# Patient Record
Sex: MALE | Race: WHITE | NOT HISPANIC OR LATINO | URBAN - NONMETROPOLITAN AREA
[De-identification: names, ages, dates, MRNs, and addresses within clinical notes are randomized per-mention and may not be internally consistent; named-entity substitution may affect disease eponyms.]

---

## 2021-04-14 NOTE — PATIENT DISCUSSION
The patient was informed that with the Basic + option and a near vision goal, there is no guarantee of achieving near vision without glasses after surgery. They will most likely need prescription glasses at all focal points. The patient elects Basic + OS, goal of -2.00.

## 2021-05-11 NOTE — PATIENT DISCUSSION
Cataract surgery has been performed in the first eye and activities of daily living are still impaired. The patient would like to proceed with cataract surgery in the second eye as scheduled. The patient elects Basic PLUS IOL OD, goal of -2.00.

## 2021-09-28 ENCOUNTER — IMPORTED ENCOUNTER (OUTPATIENT)
Dept: URBAN - NONMETROPOLITAN AREA CLINIC 1 | Facility: CLINIC | Age: 66
End: 2021-09-28

## 2021-09-28 PROCEDURE — 92310 CONTACT LENS FITTING OU: CPT

## 2021-09-28 PROCEDURE — 99204 OFFICE O/P NEW MOD 45 MIN: CPT

## 2021-09-28 NOTE — PATIENT DISCUSSION
Cataracts OU-  discussed findings w/patient-  no treatment indicated at this time-  UV protection recommended-  continue to monitor yearly or prnPVD OU-  discussed findings w/patient-  Retina flat 360 with no breaks tears or heme. -  S&S of RD/RT reviewed with pt. -  Stressed that pt should contact our office right away with any changes or increase in symptoms.-  RTC 1 year f/u or prnCompound Myopic Astigmatism OU w/Presbyopia-  discussed findings w/patient-  new spectacle/CL Rx issued-  continue to monitor yearly or prn; 's Notes: MR 9/28/2021DFE 9/28/2021

## 2021-09-29 PROBLEM — H25.13: Noted: 2021-09-29

## 2021-09-29 PROBLEM — H52.4: Noted: 2021-09-29

## 2021-09-29 PROBLEM — H52.223: Noted: 2021-09-29

## 2021-09-29 PROBLEM — H52.13: Noted: 2021-09-29

## 2022-04-10 ASSESSMENT — TONOMETRY
OD_IOP_MMHG: 16
OS_IOP_MMHG: 17

## 2022-04-10 ASSESSMENT — VISUAL ACUITY
OU_CC: 20/20
OD_GLARE: 20/40
OD_SC: 20/20
OS_SC: 20/20

## 2022-06-14 NOTE — PATIENT DISCUSSION
The IOP is above the target range in the left eye. Patient is to see Dr. Teddy Lebron for a consult. +/- SLT OS.

## 2022-08-25 NOTE — PATIENT DISCUSSION
The IOP is above the target range in the left eye. Recom. SLT OS secondary  to IOP fluctuation  and eye discomfort (pressure feelings).

## 2022-09-01 NOTE — PROCEDURE NOTE: CLINICAL
PROCEDURE NOTE: SLT #1 OS. Diagnosis: POAG, Mild. Anesthesia: Topical. Prep: Alphagan 0.15%. Prior to treatment, risks/benefits/alternatives discussed including infection, loss of vision, hemorrhage, cataract, glaucoma, retinal tears or detachment. Lens:  SLT laser lens with goniosol. Power: 1.2mJ. Total applications: 787. Application 743 degrees. Patient tolerated procedure well. There were no complications. Post-op instructions given. Post-op IOP = 15 mmHg. Luz Maria Ortiz

## 2022-12-06 NOTE — PATIENT DISCUSSION
New retinal tear seen today. Recommended treatment with barrier laser, Pt consented and tolerated procedure well.

## 2022-12-06 NOTE — PROCEDURE NOTE: CLINICAL
PROCEDURE NOTE: Laser for Retinal Tear OD. Diagnosis: Horseshoe Tear of Retina Without Detachment. Anesthesia: Topical. Prior to laser, risks/benefits/alternatives to laser discussed including loss of vision, decreased peripheral and night vision, need for more laser and/or surgery and patient wished to proceed. A written consent is on file, and the need for today’s laser was discussed and the patient is understanding and wishes to proceed. Laser Lens: super quad/28 D. Wavelength: Argon Green. Spot size: 100 um. Pulse power: 360 -500  mW. Number of pulses: 40. Patient tolerated procedure well. There were no complications. Post-op instructions given. Patient given office phone number/answering service number and advised to call immediately should there be loss of vision or pain, or should they have any other questions or concerns. Tomasz Sanches

## 2022-12-06 NOTE — PATIENT DISCUSSION
New retinal tear seen today. Recommended treatment with barrier laser pt consents and tolerates procedure well.

## 2024-02-25 NOTE — PATIENT DISCUSSION
Discharge Summary       PATIENT ID: Hermes Schumacher  MRN: [id number]   YOB: 1972    DATE OF ADMISSION: 2/20/2024  1:35 PM    DATE OF DISCHARGE: 2/24/24   PRIMARY CARE PROVIDER: No primary care provider on file.     ATTENDING PHYSICIAN: ALEX  DISCHARGING PROVIDER: Jens Perez MD      CONSULTATIONS: IP CONSULT TO HOSPITALIST  IP CONSULT TO HEPATOLOGY    PROCEDURES/SURGERIES: Procedure(s):  EGD ESOPHAGOGASTRODUODENOSCOPY, biopsy    ADMISSION SUMMARY AND HOSPITAL COURSE:     This is a 52-year-old man with a past medical history significant for alcoholic liver cirrhosis, status post upper GI bleed, who presented at [facility] with nausea and vomiting.  The patient's symptoms started on the day of presentation at the emergency room.  The patient stated that for the past few days, he has been feeling unwell, but today in the morning, the patient developed the nausea and vomiting, which is progressively getting worse.  He was recently admitted to this hospital.  The patient was admitted and treated for anemia.  During that hospitalization, the patient underwent EGD, which did not show varices but shows a blood clot in the stomach, which was suctioned out, as well as multiple small AVM-like lesions.  No acute bleeding was reported.  The patient received a blood transfusion.  The patient also presented at that time with alcoholic liver cirrhosis with ascites.  He was started on a diuretic.  The patient was discharged home with followup appointment with the hepatologist.  The patient is here to be seen by the hepatologist.  He was admitted from 01/26/2024 to 02/02/2024.  When the patient arrived at the [facility] Emergency Room today, the patient was noted to have a hemoglobin level at this point of 3.  The patient was referred to the hospitalist service for admission.  The patient denies taking aspirin or nonsteroidal anti-inflammatory drugs.  He has not  Retinal tear and detachment warning symptoms reviewed. chest. No change.     Greater than 30 minutes were spent with the patient on counseling and coordination of care    Signed:   Jens Perez MD

## 2025-03-24 NOTE — PATIENT DISCUSSION
Number Of Patches (Maximum Allowable Per Dos By Cms Is 90): 77 Patient understands there is an increased risk of corneal edema after cataract surgery. Detail Level: Zone Post-Care Instructions: I reviewed with the patient in detail post-care instructions. Patient should not sweat, pick at, or get the patches wet for 48 hours. Consent: Verbal consent obtained, risks reviewed including but not limited to rash, itching, allergic reaction, systemic rash, remote possibility of anaphylaxis to allergen.